# Patient Record
Sex: MALE | Race: WHITE | NOT HISPANIC OR LATINO
[De-identification: names, ages, dates, MRNs, and addresses within clinical notes are randomized per-mention and may not be internally consistent; named-entity substitution may affect disease eponyms.]

---

## 2020-09-26 ENCOUNTER — TRANSCRIPTION ENCOUNTER (OUTPATIENT)
Age: 55
End: 2020-09-26

## 2021-07-29 ENCOUNTER — TRANSCRIPTION ENCOUNTER (OUTPATIENT)
Age: 56
End: 2021-07-29

## 2021-09-18 ENCOUNTER — TRANSCRIPTION ENCOUNTER (OUTPATIENT)
Age: 56
End: 2021-09-18

## 2021-12-07 ENCOUNTER — TRANSCRIPTION ENCOUNTER (OUTPATIENT)
Age: 56
End: 2021-12-07

## 2022-04-06 ENCOUNTER — TRANSCRIPTION ENCOUNTER (OUTPATIENT)
Age: 57
End: 2022-04-06

## 2023-03-12 ENCOUNTER — NON-APPOINTMENT (OUTPATIENT)
Age: 58
End: 2023-03-12

## 2023-04-03 ENCOUNTER — NON-APPOINTMENT (OUTPATIENT)
Age: 58
End: 2023-04-03

## 2023-06-21 ENCOUNTER — NON-APPOINTMENT (OUTPATIENT)
Age: 58
End: 2023-06-21

## 2024-04-06 ENCOUNTER — NON-APPOINTMENT (OUTPATIENT)
Age: 59
End: 2024-04-06

## 2024-04-08 ENCOUNTER — APPOINTMENT (OUTPATIENT)
Dept: PHYSICAL MEDICINE AND REHAB | Facility: CLINIC | Age: 59
End: 2024-04-08
Payer: COMMERCIAL

## 2024-04-08 DIAGNOSIS — S33.5XXS SPRAIN OF LIGAMENTS OF LUMBAR SPINE, SEQUELA: ICD-10-CM

## 2024-04-08 DIAGNOSIS — M41.9 SCOLIOSIS, UNSPECIFIED: ICD-10-CM

## 2024-04-08 DIAGNOSIS — I10 ESSENTIAL (PRIMARY) HYPERTENSION: ICD-10-CM

## 2024-04-08 DIAGNOSIS — M62.830 MUSCLE SPASM OF BACK: ICD-10-CM

## 2024-04-08 DIAGNOSIS — M51.9 UNSPECIFIED THORACIC, THORACOLUMBAR AND LUMBOSACRAL INTERVERTEBRAL DISC DISORDER: ICD-10-CM

## 2024-04-08 DIAGNOSIS — M24.9 JOINT DERANGEMENT, UNSPECIFIED: ICD-10-CM

## 2024-04-08 PROCEDURE — 99205 OFFICE O/P NEW HI 60 MIN: CPT

## 2024-04-08 RX ORDER — ATORVASTATIN CALCIUM 80 MG/1
TABLET, FILM COATED ORAL
Refills: 0 | Status: ACTIVE | COMMUNITY

## 2024-04-08 RX ORDER — CLOPIDOGREL 75 MG/1
TABLET, FILM COATED ORAL
Refills: 0 | Status: ACTIVE | COMMUNITY

## 2024-04-08 RX ORDER — ASPIRIN/CALCIUM/MAG/ALUMINUM 325 MG
TABLET ORAL
Refills: 0 | Status: ACTIVE | COMMUNITY

## 2024-04-08 RX ORDER — CARVEDILOL 12.5 MG/1
12.5 TABLET, FILM COATED ORAL
Refills: 0 | Status: ACTIVE | COMMUNITY

## 2024-04-08 RX ORDER — CYCLOBENZAPRINE HYDROCHLORIDE 5 MG/1
5 TABLET, FILM COATED ORAL 3 TIMES DAILY
Qty: 30 | Refills: 0 | Status: ACTIVE | COMMUNITY
Start: 2024-04-08 | End: 1900-01-01

## 2024-04-08 RX ORDER — LOSARTAN POTASSIUM 100 MG/1
TABLET, FILM COATED ORAL
Refills: 0 | Status: ACTIVE | COMMUNITY

## 2024-04-08 NOTE — END OF VISIT
[FreeTextEntry3] : The documentation recorded by the scribe, in my presence, accurately reflects the service I, Dr. Sanchez, personally performed, and the decisions made by me with my edits as appropriate.

## 2024-04-08 NOTE — PHYSICAL EXAM
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    Reflexes (R) L/E:                   Quadriceps 2                   Achilles 2  Reflexes (L) L/E:                    Quadriceps 2                    Achilles 2   Sensory L/E: intact      Good peripheral Pulses Bilateral L/E  No gross atrophy    Testing: Patricks (R) [- ]               Patricks (L) [- ]               Babinski [down going bilaterally]               Chaddock [ -bilaterally]               [place] [ -bilaterally]               Gonda [- bilaterally]               Clonus [- ankle bilaterally]               Leseagues (R) [- ]               Leseagues (L) [- ]               Kemps [ -] SI Jt Lig.Challenege Test (R) + SI Jt Lig.Challenege Test (L) - S.L.R. ( R )-60 degrees with hamstring tightness  S.L.R. ( L ) -60 degrees with hamstring tightenss Range of motion testing was performed with the use of a goniometer.                            Flexion: 50 degrees (normal - 75-90)                           Extension: 10 degrees (normal - 30)                           Lateral Bending ( R ): 25 degrees  (normal - 35)                           Lateral Bending ( L ): 30 degrees (normal - 35)                           Thoracic Rotation ( R ):25 degrees (normal - 35)                           Thoracic Rotation ( L ): 30 degrees (normal - 35)                           Internal Rotation Femur ( R ):  WNL                           External Rotation Femur ( R ): WNL                           Internal Rotation Femur ( L ): WNL                          External Rotation Femur (L): WNL  Vibratory: Intact Proprioception: Intact MMT: Intact Palpation of the Lumbar Spine: Tenderness involving the L4/L5 and L5/S1 interspace. Tenderness involving the bilateral sacroiliac joints. Tenderness and spasm involving the bilateral lower lumbar paraspinal musculature. Trigger points involving the bilateral gluteal musculature.
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
## 2024-04-08 NOTE — ASSESSMENT
[FreeTextEntry1] :  Flexeril 5mg po TID #30 PRN   Tylenol PRN for low back pain  PT 2-3x WEEKLY/X6 WEEKS - L/S MH, ES, DTM - L/S & GLUTEAL MUSCULATURE JUAN FLEXION GLUTEAL & PIRIFORMS STRETCHING QUAD HAMSTRING STRETCHING & STRENGTHENING  CORE AND L/E- PRE'S INSTRUCTION IN PROPER BODY MECHANICS AND POSTURING. INSTRUCTION IN HEP.  Home exercise plan reviewed with patient. Stressed the importance for the need for frequent change in position from sit to stand and stand to sit, as well as use of weight shifting techniques to alleviate low back discomfort. Instruction in proper posturing, body mechanics and lifting techniques.  At least 60 minutes was spent with patient face to face examining patient, reviewing findings/results, counseling patient and coordinating treatment program. Ample time was provided to answer any questions or address concerns to the patients satisfaction.  Recheck in 4 to 6weeks

## 2024-05-09 ENCOUNTER — APPOINTMENT (OUTPATIENT)
Dept: PHYSICAL MEDICINE AND REHAB | Facility: CLINIC | Age: 59
End: 2024-05-09